# Patient Record
Sex: FEMALE | Race: BLACK OR AFRICAN AMERICAN
[De-identification: names, ages, dates, MRNs, and addresses within clinical notes are randomized per-mention and may not be internally consistent; named-entity substitution may affect disease eponyms.]

---

## 2020-09-04 ENCOUNTER — HOSPITAL ENCOUNTER (OUTPATIENT)
Dept: HOSPITAL 18 - NAV RAD | Age: 55
Discharge: HOME | End: 2020-09-04
Payer: COMMERCIAL

## 2020-09-04 DIAGNOSIS — M25.562: Primary | ICD-10-CM

## 2020-09-04 DIAGNOSIS — M17.12: ICD-10-CM

## 2020-09-04 NOTE — RAD
XR Knee Lt 4 View STANDARD



HISTORY: Left knee pain



COMPARISON: None.



FINDINGS: 

There are marked degenerative changes in the left knee, most prominent in the medial tibiofemoral and
 patellofemoral compartments. No acute fracture or dislocation is seen.



IMPRESSION: Left knee osteoarthritis



Reported By: Last Platt 

Electronically Signed:  9/4/2020 11:27 AM

## 2023-11-13 ENCOUNTER — HOSPITAL ENCOUNTER (OUTPATIENT)
Dept: HOSPITAL 92 - BICMAMMO | Age: 58
Discharge: HOME | End: 2023-11-13
Attending: FAMILY MEDICINE
Payer: COMMERCIAL

## 2023-11-13 DIAGNOSIS — Z80.3: ICD-10-CM

## 2023-11-13 DIAGNOSIS — Z12.31: Primary | ICD-10-CM

## 2023-11-13 PROCEDURE — 77067 SCR MAMMO BI INCL CAD: CPT

## 2023-11-13 PROCEDURE — 77063 BREAST TOMOSYNTHESIS BI: CPT

## 2024-12-10 ENCOUNTER — HOSPITAL ENCOUNTER (OUTPATIENT)
Dept: HOSPITAL 92 - BICMAMMO | Age: 59
Discharge: HOME | End: 2024-12-10
Attending: FAMILY MEDICINE
Payer: COMMERCIAL

## 2024-12-10 DIAGNOSIS — Z12.31: Primary | ICD-10-CM

## 2024-12-10 DIAGNOSIS — Z80.3: ICD-10-CM

## 2024-12-10 PROCEDURE — 77063 BREAST TOMOSYNTHESIS BI: CPT

## 2024-12-10 PROCEDURE — 77067 SCR MAMMO BI INCL CAD: CPT
